# Patient Record
Sex: MALE | Race: WHITE
[De-identification: names, ages, dates, MRNs, and addresses within clinical notes are randomized per-mention and may not be internally consistent; named-entity substitution may affect disease eponyms.]

---

## 2020-09-07 ENCOUNTER — HOSPITAL ENCOUNTER (EMERGENCY)
Dept: HOSPITAL 26 - MED | Age: 50
Discharge: HOME | End: 2020-09-07
Payer: SELF-PAY

## 2020-09-07 VITALS — BODY MASS INDEX: 25.05 KG/M2 | HEIGHT: 70 IN | WEIGHT: 175 LBS

## 2020-09-07 VITALS — DIASTOLIC BLOOD PRESSURE: 94 MMHG | SYSTOLIC BLOOD PRESSURE: 126 MMHG

## 2020-09-07 VITALS — SYSTOLIC BLOOD PRESSURE: 120 MMHG | DIASTOLIC BLOOD PRESSURE: 87 MMHG
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
## 2020-09-07 DIAGNOSIS — S31.119D: ICD-10-CM

## 2020-09-07 DIAGNOSIS — E86.0: Primary | ICD-10-CM

## 2020-09-12 ENCOUNTER — HOSPITAL ENCOUNTER (EMERGENCY)
Dept: HOSPITAL 54 - ER | Age: 50
Discharge: HOME | End: 2020-09-12
Payer: COMMERCIAL

## 2020-09-12 VITALS — HEIGHT: 67 IN | BODY MASS INDEX: 19.78 KG/M2 | WEIGHT: 126 LBS

## 2020-09-12 VITALS — DIASTOLIC BLOOD PRESSURE: 72 MMHG | SYSTOLIC BLOOD PRESSURE: 125 MMHG

## 2020-09-12 DIAGNOSIS — Z59.0: ICD-10-CM

## 2020-09-12 DIAGNOSIS — R40.0: ICD-10-CM

## 2020-09-12 DIAGNOSIS — F90.9: ICD-10-CM

## 2020-09-12 DIAGNOSIS — F15.10: Primary | ICD-10-CM

## 2020-09-12 LAB
ALBUMIN SERPL BCP-MCNC: 3.4 G/DL (ref 3.4–5)
ALP SERPL-CCNC: 109 U/L (ref 46–116)
ALT SERPL W P-5'-P-CCNC: 10 U/L (ref 12–78)
APAP SERPL-MCNC: < 2 UG/ML (ref 10–30)
APPEARANCE UR: CLEAR
AST SERPL W P-5'-P-CCNC: 14 U/L (ref 15–37)
BASOPHILS # BLD AUTO: 0 /CMM (ref 0–0.2)
BASOPHILS NFR BLD AUTO: 0.6 % (ref 0–2)
BILIRUB DIRECT SERPL-MCNC: 0.1 MG/DL (ref 0–0.2)
BILIRUB SERPL-MCNC: 0.2 MG/DL (ref 0.2–1)
BILIRUB UR QL STRIP: NEGATIVE
BUN SERPL-MCNC: 11 MG/DL (ref 7–18)
CALCIUM SERPL-MCNC: 8.9 MG/DL (ref 8.5–10.1)
CHLORIDE SERPL-SCNC: 101 MMOL/L (ref 98–107)
CO2 SERPL-SCNC: 29 MMOL/L (ref 21–32)
COLOR UR: YELLOW
CREAT SERPL-MCNC: 0.7 MG/DL (ref 0.6–1.3)
EOSINOPHIL NFR BLD AUTO: 0.8 % (ref 0–6)
ETHANOL SERPL-MCNC: < 3 MG/DL (ref 0–0)
GLUCOSE SERPL-MCNC: 96 MG/DL (ref 74–106)
GLUCOSE UR STRIP-MCNC: NEGATIVE MG/DL
HCT VFR BLD AUTO: 43 % (ref 39–51)
HGB BLD-MCNC: 13.7 G/DL (ref 13.5–17.5)
HGB UR QL STRIP: NEGATIVE ERY/UL
KETONES UR STRIP-MCNC: NEGATIVE MG/DL
LEUKOCYTE ESTERASE UR QL STRIP: NEGATIVE
LYMPHOCYTES NFR BLD AUTO: 1.7 /CMM (ref 0.8–4.8)
LYMPHOCYTES NFR BLD AUTO: 19.3 % (ref 20–44)
MCHC RBC AUTO-ENTMCNC: 32 G/DL (ref 31–36)
MCV RBC AUTO: 96 FL (ref 80–96)
MONOCYTES NFR BLD AUTO: 0.7 /CMM (ref 0.1–1.3)
MONOCYTES NFR BLD AUTO: 8 % (ref 2–12)
NEUTROPHILS # BLD AUTO: 6.2 /CMM (ref 1.8–8.9)
NEUTROPHILS NFR BLD AUTO: 71.3 % (ref 43–81)
NITRITE UR QL STRIP: NEGATIVE
PH UR STRIP: 8 [PH] (ref 5–8)
PLATELET # BLD AUTO: 347 /CMM (ref 150–450)
POTASSIUM SERPL-SCNC: 3.4 MMOL/L (ref 3.5–5.1)
PROT SERPL-MCNC: 7.2 G/DL (ref 6.4–8.2)
PROT UR QL STRIP: NEGATIVE MG/DL
RBC # BLD AUTO: 4.45 MIL/UL (ref 4.5–6)
SALICYLATES SERPL-MCNC: 1.5 MG/DL (ref 2.8–20)
SODIUM SERPL-SCNC: 140 MMOL/L (ref 136–145)
UROBILINOGEN UR STRIP-MCNC: 0.2 EU/DL
WBC NRBC COR # BLD AUTO: 8.6 K/UL (ref 4.3–11)

## 2020-09-12 PROCEDURE — 80329 ANALGESICS NON-OPIOID 1 OR 2: CPT

## 2020-09-12 PROCEDURE — 85025 COMPLETE CBC W/AUTO DIFF WBC: CPT

## 2020-09-12 PROCEDURE — 80076 HEPATIC FUNCTION PANEL: CPT

## 2020-09-12 PROCEDURE — 99285 EMERGENCY DEPT VISIT HI MDM: CPT

## 2020-09-12 PROCEDURE — 80048 BASIC METABOLIC PNL TOTAL CA: CPT

## 2020-09-12 PROCEDURE — 36415 COLL VENOUS BLD VENIPUNCTURE: CPT

## 2020-09-12 PROCEDURE — 80305 DRUG TEST PRSMV DIR OPT OBS: CPT

## 2020-09-12 PROCEDURE — G0480 DRUG TEST DEF 1-7 CLASSES: HCPCS

## 2020-09-12 PROCEDURE — 81001 URINALYSIS AUTO W/SCOPE: CPT

## 2020-09-12 PROCEDURE — 80307 DRUG TEST PRSMV CHEM ANLYZR: CPT

## 2020-09-12 SDOH — ECONOMIC STABILITY - HOUSING INSECURITY: HOMELESSNESS: Z59.0

## 2020-09-12 NOTE — NUR
PT  REC'D TO ER  VIA  EMS   HOMELESS .    LIDIA  FEET    ABRASIONS  NOTED  
CLEAMED   DRESSING  APPLIED  AWAITING EVALUATION BY ER PROVIDER.